# Patient Record
Sex: FEMALE | Race: WHITE | NOT HISPANIC OR LATINO | Employment: OTHER | URBAN - METROPOLITAN AREA
[De-identification: names, ages, dates, MRNs, and addresses within clinical notes are randomized per-mention and may not be internally consistent; named-entity substitution may affect disease eponyms.]

---

## 2017-01-13 ENCOUNTER — HOSPITAL ENCOUNTER (OUTPATIENT)
Dept: MAMMOGRAPHY | Facility: CLINIC | Age: 67
Discharge: HOME | End: 2017-01-13
Attending: FAMILY MEDICINE | Admitting: FAMILY MEDICINE
Payer: COMMERCIAL

## 2017-01-13 DIAGNOSIS — Z12.31 VISIT FOR SCREENING MAMMOGRAM: ICD-10-CM

## 2017-01-13 PROCEDURE — G0202 SCR MAMMO BI INCL CAD: HCPCS

## 2017-08-21 ENCOUNTER — MYC MEDICAL ADVICE (OUTPATIENT)
Dept: FAMILY MEDICINE | Facility: OTHER | Age: 67
End: 2017-08-21

## 2017-08-26 ENCOUNTER — MYC MEDICAL ADVICE (OUTPATIENT)
Dept: FAMILY MEDICINE | Facility: OTHER | Age: 67
End: 2017-08-26

## 2017-11-13 DIAGNOSIS — F41.1 GENERALIZED ANXIETY DISORDER: ICD-10-CM

## 2017-11-13 DIAGNOSIS — E78.5 HYPERLIPIDEMIA, UNSPECIFIED: ICD-10-CM

## 2017-11-13 DIAGNOSIS — R94.6 NONSPECIFIC ABNORMAL RESULTS OF THYROID FUNCTION STUDY: ICD-10-CM

## 2017-11-13 DIAGNOSIS — M81.0 AGE-RELATED OSTEOPOROSIS WITHOUT CURRENT PATHOLOGICAL FRACTURE: Primary | ICD-10-CM

## 2017-11-13 NOTE — TELEPHONE ENCOUNTER
LORazepam (ATIVAN) 1 MG tablet      Last Written Prescription Date:  12/2/16  Last Fill Quantity: 90,   # refills: 1  Future Office visit:       Routing refill request to provider for review/approval because:  Drug not on the [facility] or [facility] refill protocol or controlled substance

## 2017-11-14 PROBLEM — M81.0 AGE-RELATED OSTEOPOROSIS WITHOUT CURRENT PATHOLOGICAL FRACTURE: Status: ACTIVE | Noted: 2017-11-14

## 2017-11-14 RX ORDER — SIMVASTATIN 40 MG
TABLET ORAL
Qty: 30 TABLET | Refills: 0 | Status: SHIPPED | OUTPATIENT
Start: 2017-11-14

## 2017-11-14 RX ORDER — ALENDRONATE SODIUM 70 MG/1
TABLET ORAL
Qty: 12 TABLET | Refills: 0 | Status: ON HOLD | OUTPATIENT
Start: 2017-11-14 | End: 2018-04-11

## 2017-11-14 RX ORDER — LEVOTHYROXINE SODIUM 75 UG/1
TABLET ORAL
Qty: 30 TABLET | Refills: 0 | Status: SHIPPED | OUTPATIENT
Start: 2017-11-14

## 2017-11-14 NOTE — TELEPHONE ENCOUNTER
Fosamax:  Routing refill request to provider for review/approval because:  Labs not current:  DEXA     Synthroid:  Prescription approved per [facility] Refill Protocol.    Angela Montaño, RN, BSN

## 2017-11-14 NOTE — TELEPHONE ENCOUNTER
Requested Prescriptions   Pending Prescriptions Disp Refills     simvastatin (ZOCOR) 40 MG tablet 365 tablet 0     Sig: TAKE ONE TABLET BY MOUTH AT BEDTIME    [facility]ins Protocol Passed    2017  3:52 PM       Passed - LDL on file in past 12 months    Recent Labs   Lab Test  16   0729   LDL  83            Passed - No abnormal creatine kinase in past 12 months    No lab results found.         Passed - Recent or future visit with authorizing provider    Patient had office visit in the last year or has a visit in the next 30 days with authorizing provider.  See chart review.              Passed - Patient is age 18 or older       Passed - No active pregnancy on record       Passed - No positive pregnancy test in past 12 months        LORazepam (ATIVAN) 1 MG tablet 90 tablet 1     Si TABLET PO QHS prn    There is no refill protocol information for this order        simvastatin (ZOCOR) 40 MG tablet  Medication is being filled for 1 time refill only due to:  Patient needs to be seen because due for physical.    LORazepam (ATIVAN) 1 MG tablet  Routing refill request to provider for review/approval because:  Drug not on the [facility] refill protocol   Cate Jin, RN, BSN

## 2017-11-14 NOTE — TELEPHONE ENCOUNTER
Medication(s) reviewed and approved. Rx. was faxed to the designated pharmacy.      Please notify that this has been done.      Please close the encounter when finished with it.     Samantha Perez PA-C

## 2017-11-15 RX ORDER — LORAZEPAM 1 MG/1
TABLET ORAL
Qty: 30 TABLET | Refills: 0 | Status: SHIPPED | OUTPATIENT
Start: 2017-11-15

## 2017-11-15 NOTE — TELEPHONE ENCOUNTER
appropriate with only 1 fill this last year.  Coming up on annual visit in a few weeks.  Please call to schedule. TC patient.  Kanwal Pritchett MD

## 2017-11-15 NOTE — TELEPHONE ENCOUNTER
Patient returned call and received message below and patient thought she told who ever called her yesterday that we are no longer her clinic and to cancel medication request.    Sarah Perez  Reception/ Scheduling

## 2018-03-26 RX ORDER — KETOROLAC TROMETHAMINE 5 MG/ML
SOLUTION OPHTHALMIC
COMMUNITY
End: 2023-09-11

## 2018-03-26 RX ORDER — PREDNISOLONE ACETATE 10 MG/ML
SUSPENSION/ DROPS OPHTHALMIC
COMMUNITY
End: 2023-09-11

## 2018-03-26 RX ORDER — OFLOXACIN 3 MG/ML
SOLUTION/ DROPS OPHTHALMIC
COMMUNITY
End: 2023-09-11

## 2018-03-28 ENCOUNTER — HOSPITAL ENCOUNTER (OUTPATIENT)
Facility: CLINIC | Age: 68
Discharge: HOME | End: 2018-03-28
Attending: OPHTHALMOLOGY | Admitting: OPHTHALMOLOGY
Payer: MEDICARE

## 2018-03-28 ENCOUNTER — SURGERY (OUTPATIENT)
Age: 68
End: 2018-03-28

## 2018-03-28 ENCOUNTER — ANESTHESIA EVENT (OUTPATIENT)
Dept: SURGERY | Facility: CLINIC | Age: 68
End: 2018-03-28
Payer: MEDICARE

## 2018-03-28 ENCOUNTER — ANESTHESIA (OUTPATIENT)
Dept: SURGERY | Facility: CLINIC | Age: 68
End: 2018-03-28
Payer: MEDICARE

## 2018-03-28 VITALS
SYSTOLIC BLOOD PRESSURE: 117 MMHG | HEIGHT: 61 IN | OXYGEN SATURATION: 96 % | BODY MASS INDEX: 27.26 KG/M2 | RESPIRATION RATE: 14 BRPM | DIASTOLIC BLOOD PRESSURE: 66 MMHG | WEIGHT: 144.4 LBS | TEMPERATURE: 98.6 F

## 2018-03-28 DIAGNOSIS — H25.11 NUCLEAR SCLEROTIC CATARACT, RIGHT: Primary | ICD-10-CM

## 2018-03-28 PROCEDURE — 27210794 ZZH OR GENERAL SUPPLY STERILE: Performed by: OPHTHALMOLOGY

## 2018-03-28 PROCEDURE — V2632 POST CHMBR INTRAOCULAR LENS: HCPCS | Performed by: OPHTHALMOLOGY

## 2018-03-28 PROCEDURE — 25000125 ZZHC RX 250: Performed by: ANESTHESIOLOGY

## 2018-03-28 PROCEDURE — 25000128 H RX IP 250 OP 636: Performed by: ANESTHESIOLOGY

## 2018-03-28 PROCEDURE — 25000125 ZZHC RX 250: Performed by: OPHTHALMOLOGY

## 2018-03-28 PROCEDURE — 40000170 ZZH STATISTIC PRE-PROCEDURE ASSESSMENT II: Performed by: OPHTHALMOLOGY

## 2018-03-28 PROCEDURE — 25000128 H RX IP 250 OP 636: Performed by: NURSE ANESTHETIST, CERTIFIED REGISTERED

## 2018-03-28 PROCEDURE — 37000008 ZZH ANESTHESIA TECHNICAL FEE, 1ST 30 MIN: Performed by: OPHTHALMOLOGY

## 2018-03-28 PROCEDURE — 36000101 ZZH EYE SURGERY LEVEL 3 1ST 30 MIN: Performed by: OPHTHALMOLOGY

## 2018-03-28 PROCEDURE — 71000028 ZZH EYE RECOVERY PHASE 2 EACH 15 MINS: Performed by: OPHTHALMOLOGY

## 2018-03-28 PROCEDURE — 25000128 H RX IP 250 OP 636: Performed by: OPHTHALMOLOGY

## 2018-03-28 DEVICE — EYE IMP IOL AMO PCL TECNIS ZCB00 22.0: Type: IMPLANTABLE DEVICE | Site: EYE | Status: FUNCTIONAL

## 2018-03-28 RX ORDER — PHENYLEPHRINE HYDROCHLORIDE 25 MG/ML
1 SOLUTION/ DROPS OPHTHALMIC
Status: COMPLETED | OUTPATIENT
Start: 2018-03-28 | End: 2018-03-28

## 2018-03-28 RX ORDER — TROPICAMIDE 10 MG/ML
1 SOLUTION/ DROPS OPHTHALMIC
Status: COMPLETED | OUTPATIENT
Start: 2018-03-28 | End: 2018-03-28

## 2018-03-28 RX ORDER — MOXIFLOXACIN 5 MG/ML
1 SOLUTION/ DROPS OPHTHALMIC
Status: COMPLETED | OUTPATIENT
Start: 2018-03-28 | End: 2018-03-28

## 2018-03-28 RX ORDER — PREDNISOLONE ACETATE 1 %
SUSPENSION, DROPS(FINAL DOSAGE FORM)(ML) OPHTHALMIC (EYE) PRN
Status: DISCONTINUED | OUTPATIENT
Start: 2018-03-28 | End: 2018-03-28 | Stop reason: HOSPADM

## 2018-03-28 RX ORDER — SODIUM CHLORIDE, SODIUM LACTATE, POTASSIUM CHLORIDE, CALCIUM CHLORIDE 600; 310; 30; 20 MG/100ML; MG/100ML; MG/100ML; MG/100ML
500 INJECTION, SOLUTION INTRAVENOUS CONTINUOUS
Status: DISCONTINUED | OUTPATIENT
Start: 2018-03-28 | End: 2018-03-28 | Stop reason: HOSPADM

## 2018-03-28 RX ORDER — CYCLOPENTOLATE HYDROCHLORIDE 10 MG/ML
1 SOLUTION/ DROPS OPHTHALMIC
Status: COMPLETED | OUTPATIENT
Start: 2018-03-28 | End: 2018-03-28

## 2018-03-28 RX ORDER — ONDANSETRON 2 MG/ML
INJECTION INTRAMUSCULAR; INTRAVENOUS PRN
Status: DISCONTINUED | OUTPATIENT
Start: 2018-03-28 | End: 2018-03-28

## 2018-03-28 RX ORDER — PROPARACAINE HYDROCHLORIDE 5 MG/ML
1 SOLUTION/ DROPS OPHTHALMIC ONCE
Status: COMPLETED | OUTPATIENT
Start: 2018-03-28 | End: 2018-03-28

## 2018-03-28 RX ORDER — BALANCED SALT SOLUTION 6.4; .75; .48; .3; 3.9; 1.7 MG/ML; MG/ML; MG/ML; MG/ML; MG/ML; MG/ML
SOLUTION OPHTHALMIC PRN
Status: DISCONTINUED | OUTPATIENT
Start: 2018-03-28 | End: 2018-03-28 | Stop reason: HOSPADM

## 2018-03-28 RX ORDER — PROPARACAINE HYDROCHLORIDE 5 MG/ML
1 SOLUTION/ DROPS OPHTHALMIC ONCE
Status: DISCONTINUED | OUTPATIENT
Start: 2018-03-28 | End: 2018-03-28 | Stop reason: HOSPADM

## 2018-03-28 RX ORDER — LIDOCAINE HYDROCHLORIDE 10 MG/ML
INJECTION, SOLUTION EPIDURAL; INFILTRATION; INTRACAUDAL; PERINEURAL PRN
Status: DISCONTINUED | OUTPATIENT
Start: 2018-03-28 | End: 2018-03-28 | Stop reason: HOSPADM

## 2018-03-28 RX ADMIN — ONDANSETRON 4 MG: 2 INJECTION INTRAMUSCULAR; INTRAVENOUS at 14:11

## 2018-03-28 RX ADMIN — MIDAZOLAM 2 MG: 1 INJECTION INTRAMUSCULAR; INTRAVENOUS at 14:10

## 2018-03-28 RX ADMIN — Medication 1 DROP: at 14:20

## 2018-03-28 RX ADMIN — SODIUM CHONDROITIN SULFATE / SODIUM HYALURONATE 1 ML: 0.55-0.5 INJECTION INTRAOCULAR at 14:20

## 2018-03-28 RX ADMIN — MOXIFLOXACIN 1 DROP: 5 SOLUTION/ DROPS OPHTHALMIC at 12:38

## 2018-03-28 RX ADMIN — CYCLOPENTOLATE HYDROCHLORIDE 1 DROP: 10 SOLUTION/ DROPS OPHTHALMIC at 12:31

## 2018-03-28 RX ADMIN — TROPICAMIDE 1 DROP: 10 SOLUTION/ DROPS OPHTHALMIC at 12:31

## 2018-03-28 RX ADMIN — PHENYLEPHRINE HYDROCHLORIDE 1 DROP: 2.5 SOLUTION/ DROPS OPHTHALMIC at 12:31

## 2018-03-28 RX ADMIN — MOXIFLOXACIN 1 DROP: 5 SOLUTION/ DROPS OPHTHALMIC at 12:26

## 2018-03-28 RX ADMIN — LIDOCAINE HYDROCHLORIDE 1 ML: 10 INJECTION, SOLUTION EPIDURAL; INFILTRATION; INTRACAUDAL; PERINEURAL at 14:20

## 2018-03-28 RX ADMIN — MOXIFLOXACIN 1 DROP: 5 SOLUTION/ DROPS OPHTHALMIC at 12:31

## 2018-03-28 RX ADMIN — TROPICAMIDE 1 DROP: 10 SOLUTION/ DROPS OPHTHALMIC at 12:38

## 2018-03-28 RX ADMIN — CYCLOPENTOLATE HYDROCHLORIDE 1 DROP: 10 SOLUTION/ DROPS OPHTHALMIC at 12:38

## 2018-03-28 RX ADMIN — EPINEPHRINE 500 ML: 1 INJECTION, SOLUTION, CONCENTRATE INTRAVENOUS at 14:19

## 2018-03-28 RX ADMIN — PHENYLEPHRINE HYDROCHLORIDE 1 DROP: 2.5 SOLUTION/ DROPS OPHTHALMIC at 12:38

## 2018-03-28 RX ADMIN — SODIUM CHLORIDE, POTASSIUM CHLORIDE, SODIUM LACTATE AND CALCIUM CHLORIDE 500 ML: 600; 310; 30; 20 INJECTION, SOLUTION INTRAVENOUS at 12:39

## 2018-03-28 RX ADMIN — BALANCED SALT SOLUTION 15 ML: 6.4; .75; .48; .3; 3.9; 1.7 SOLUTION OPHTHALMIC at 14:19

## 2018-03-28 RX ADMIN — TROPICAMIDE 1 DROP: 10 SOLUTION/ DROPS OPHTHALMIC at 12:25

## 2018-03-28 RX ADMIN — PHENYLEPHRINE HYDROCHLORIDE 1 DROP: 2.5 SOLUTION/ DROPS OPHTHALMIC at 12:26

## 2018-03-28 RX ADMIN — PROPARACAINE HYDROCHLORIDE 1 DROP: 5 SOLUTION/ DROPS OPHTHALMIC at 12:26

## 2018-03-28 RX ADMIN — LIDOCAINE HYDROCHLORIDE 1 ML: 10 INJECTION, SOLUTION EPIDURAL; INFILTRATION; INTRACAUDAL; PERINEURAL at 12:38

## 2018-03-28 RX ADMIN — CYCLOPENTOLATE HYDROCHLORIDE 1 DROP: 10 SOLUTION/ DROPS OPHTHALMIC at 12:26

## 2018-03-28 ASSESSMENT — LIFESTYLE VARIABLES: TOBACCO_USE: 0

## 2018-03-28 NOTE — DISCHARGE INSTRUCTIONS
[facility] Anesthesia [facility] Care [facility] Discharge  Instructions  Anesthesia (Eye Care Center)   Adult Discharge Instructions    For 24 hours after surgery    1. Get plenty of rest.  Make arrangements to have a responsible adult stay with you for at least 6 hours after you leave the hospital.  2. Do not drive or use heavy equipment for 24 hours.    3. Do not drink alcohol for 24 hours.  4. Do not sign legal documents or make important decisions for 24 hours.  5. Avoid strenuous or risky activities. You may feel lightheaded.  If so, sit for a few minutes before standing.  Have someone help you get up.   6. Conscious sedation patients may resume a regular diet..  7. Any questions of medical nature, call your physician.    [facility]  Cataract Surgery Discharge Instructions  [facility] MD CALIXTO Cuenca MD J. Hasan, MD C. Nichols, MD J. O'Neill, MD S. Schaefer, MD J. Stephens, MD        Start using drops when you arrive at home today    Vigamox or Ofloxacin (Tan top) - one drop in surgical eye 3 times  per day until gone.     Prednisolone acetate 1.0% (pink or white top) - one drop in surgical eye 3 times per day until gone.    Ketorolac or Diclofenac (Grey top) - one drop in surgical eye 3 times per day until gone.   (Dr. Huntley's patients should use all drops 4 times daily)       OR     You may have been prescribed SmartDrops or OneDrop, which is a compound formula drop that combines all three medications in a single drop. This drop should be instilled to the surgical eye 3 times daily until gone.        Place shield over surgical eye at bedtime for 3 nights.      The eye will feel itchy, scratchy, and vision will be blurred, you may take Tylenol for the scratchy feeling if this is bothersome.      No eye rubbing or swimming for I week.      You may resume all prescription medications as directed by your primary  doctor.      Call if increasing pain, progressively worsening vision or worsening redness of surgical eye.      On-call doctor can be reached at [phone].

## 2018-03-28 NOTE — IP AVS SNAPSHOT
[facility]    [address]    [place] [address]    Phone:  [phone]    Fax:  [phone]                                       After Visit Summary   3/28/2018    Debra Fuentes    MRN: [id number]           After Visit Summary Signature Page     I have received my discharge instructions, and my questions have been answered. I have discussed any challenges I see with this plan with the nurse or doctor.    ..........................................................................................................................................  Patient/Patient Representative Signature      ..........................................................................................................................................  Patient Representative Print Name and Relationship to Patient    ..................................................               ................................................  Date                                            Time    ..........................................................................................................................................  Reviewed by Signature/Title    ...................................................              ..............................................  Date                                                            Time

## 2018-03-28 NOTE — ANESTHESIA POSTPROCEDURE EVALUATION
Patient: Debra Fuentes    Procedure(s):  RIGHT EYE PHACOEMULSIFICATION CLEAR CORNEA WITH STANDARD INTRAOCULAR LENS IMPLANT  - Wound Class: I-Clean    Diagnosis:CATARACT  Diagnosis Additional Information: No value filed.    Anesthesia Type:  MAC    Note:  Anesthesia Post Evaluation    Patient location during evaluation: PACU  Patient participation: Able to fully participate in evaluation  Level of consciousness: awake  Pain management: adequate  Cardiovascular status: acceptable  Respiratory status: acceptable  Hydration status: acceptable  PONV: none     Anesthetic complications: None          Last vitals:  Vitals:    03/28/18 1230 03/28/18 1431 03/28/18 1442   BP: 121/68 105/68 117/66   Resp: 16 14 14   Temp: 37  C (98.6  F)     SpO2: 96% 96% 96%         Electronically Signed By: KADY HESTER  March 28, 2018  4:10 PM

## 2018-03-28 NOTE — OP NOTE
PREOPERATIVE DIAGNOSIS: Visually significant cataract, Right eye   POSTOPERATIVE DIAGNOSIS: Same   PROCEDURES:   1. Cataract extraction with intraocular lens implant Right eye.  SURGEON: Damian Parnell M.D.  INDICATIONS: The patient Debra Fuentes presented to the eye clinic with decreased vision secondary to cataract in the Right eye. The risks, including, but not limited to infection, loss of vision, loss of eye, need for more surgery, and bleeding, along with the benefits, alternatives, expectations, and the procedure itself were discussed at length with the patient who wished to proceed with surgery. All questions were answered to the patient's satisfaction.  DESCRIPTION OF PROCEDURE:   Prior to the procedure, appropriate cardiac and respiratory monitors were applied to the patient.  In the pre-operative holding area, a drop of topical tetracaine followed by povidone iodine followed by lidocaine gel.  The patient was brought to the operating room where a surgical pause was carried out to identify with all members of the surgical team the correct surgical site.  With adequate anesthesia, the Right eye was prepped and draped in the usual sterile fashion. A lid speculum was placed, and the operating microscope was rotated into position. A paracentesis was created.  Through this limbal paracentesis, the anterior chamber was filled with preservative-free lidocaine followed by viscoelastic.   A temporal clear corneal incision was created at the limbus using a 2.6 mm blade. A capsulorrhexis was initiated using a cystotome and was completed in continuous and circular fashion using the capsulorrhexis forceps. The lens nucleus was hydrodissected using balanced salt solution.  The lens nucleus was rotated and removed using phacoemulsification in a stop and chop technique.  Residual cortical material was removed using irrigation-aspiration.  The capsular bag was reinflated to its maximal extent with cohesive  viscoelastic.  A 22.0 diopter ZCBOO was inserted into the capsular bag and noted to be well centered.  The lens power selected was reviewed using the intraocular lens power measurements that were obtained preoperatively to confirm that the correct lens was selected for the desired post-operative refractive state. The residual viscoelastic was aspirated. The anterior chamber was inflated with balanced salt solution and the wounds were hydrated and found to be self-sealing.  The eye was palpated and found to be of normal physiologic pressure. The lid speculum was removed. One drop of postoperative anti-inflammatory and antibiotic medication was instilled in the eye and a clear shield placed over the eye. The patient tolerated the procedure well and there were no intraoperative complications.      PLAN: The patient will be discharged to home and will follow up tomorrow in the eye clinic.  EBL:  None  Complications:  None    Implant Name Type Inv. Item Serial No.  Lot No. LRB No. Used   EYE IMP IOL NOEL PCL TECNIS ZCB00 22.0 Lens/Eye Implant EYE IMP IOL NOEL PCL TECNIS ZCB00 22.0 [id number] [employer]   Right 1

## 2018-03-28 NOTE — ANESTHESIA CARE TRANSFER NOTE
Patient: Dbera Fuentes    Procedure(s):  RIGHT EYE PHACOEMULSIFICATION CLEAR CORNEA WITH STANDARD INTRAOCULAR LENS IMPLANT  - Wound Class: I-Clean    Diagnosis: CATARACT  Diagnosis Additional Information: No value filed.    Anesthesia Type:   MAC     Note:  Airway :Room Air  Patient transferred to:Phase II  Comments: Pt awake and able to verbalize needs. Spontaneous respiration without difficulty.  All monitors on and audible. Report given to PACU RN, Vital Signs Stable. Pt denies pain.Handoff Report: Identifed the Patient, Identified the Reponsible Provider, Reviewed the pertinent medical history, Discussed the surgical course, Reviewed Intra-OP anesthesia mangement and issues during anesthesia, Set expectations for post-procedure period and Allowed opportunity for questions and acknowledgement of understanding      Vitals: (Last set prior to Anesthesia Care Transfer)    CRNA VITALS  3/28/2018 1356 - 3/28/2018 1431      3/28/2018             NIBP: 121/67    Pulse: 74    NIBP Mean: 85    Ht Rate: 73    SpO2: 99 %    Resp Rate (set): 10                Electronically Signed By: CHERI Healy CRNA  March 28, 2018  2:31 PM

## 2018-03-28 NOTE — ANESTHESIA PREPROCEDURE EVALUATION
Anesthesia Evaluation     . Pt has had prior anesthetic.     No history of anesthetic complications          ROS/MED HX    ENT/Pulmonary:      (-) tobacco use and sleep apnea   Neurologic:       Cardiovascular:     (+) Dyslipidemia, ----. : . . . :. .       METS/Exercise Tolerance:     Hematologic:         Musculoskeletal:         GI/Hepatic:        (-) GERD   Renal/Genitourinary:         Endo:     (+) thyroid problem hypothyroidism, .      Psychiatric:     (+) psychiatric history anxiety      Infectious Disease:         Malignancy:         Other:                     Physical Exam  Normal systems: cardiovascular and pulmonary    Airway   Mallampati: II  TM distance: >3 FB  Neck ROM: full    Dental   Comment: native    Cardiovascular       Pulmonary                     Anesthesia Plan      History & Physical Review  History and physical reviewed and following examination; no interval change.    ASA Status:  2 .    NPO Status:  > 8 hours    Plan for MAC          Postoperative Care      Consents  Anesthetic plan, risks, benefits and alternatives discussed with:  Patient..                          .

## 2018-03-28 NOTE — IP AVS SNAPSHOT
MRN:[id number]                      After Visit Summary   3/28/2018    Debra Fuentes    MRN: [id number]           Thank you!     Thank you for choosing [facility] for your care. Our goal is always to provide you with excellent care. Hearing back from our patients is one way we can continue to improve our services. Please take a few minutes to complete the written survey that you may receive in the mail after you visit with us. Thank you!        Patient Information     Date Of Birth          1950        About your hospital stay     You were admitted on:  March 28, 2018 You last received care in the:  [facility] [place]    You were discharged on:  March 28, 2018       Who to Call     For medical emergencies, please call 911.  For non-urgent questions about your medical care, please call your primary care provider or clinic, [phone]  For questions related to your surgery, please call your surgery clinic        Attending Provider     Provider Specialty    Damian Parnell MD Ophthalmology       Primary Care Provider Office Phone # Fax #    [facility] [phone]      Your next 10 appointments already scheduled     Apr 11, 2018   Procedure with Damian Parnell MD   [facility] (--)    [address]  [facility] [address] [phone]              Further instructions from your care team       [facility] Anesthesia [facility] Care [facility] Discharge  Instructions  Anesthesia ([facility] [place])   Adult Discharge Instructions    For 24 hours after surgery    1. Get plenty of rest.  Make arrangements to have a responsible adult stay with you for at least 6 hours after you leave the hospital.  2. Do not drive or use heavy equipment for 24 hours.    3. Do not drink alcohol for 24 hours.  4. Do not sign legal documents or make important decisions for 24 hours.  5. Avoid strenuous or risky activities. You may feel  lightheaded.  If so, sit for a few minutes before standing.  Have someone help you get up.   6. Conscious sedation patients may resume a regular diet..  7. Any questions of medical nature, call your physician.    [facility]  Cataract Surgery Discharge Instructions  [facility] MD CALIXTO Cuenca MD J. Hasan, MD C. Nichols, MD J. O'Neill, MD S. Schaefer, MD J. Stephens, MD        Start using drops when you arrive at home today    Vigamox or Ofloxacin (Tan top) - one drop in surgical eye 3 times  per day until gone.     Prednisolone acetate 1.0% (pink or white top) - one drop in surgical eye 3 times per day until gone.    Ketorolac or Diclofenac (Grey top) - one drop in surgical eye 3 times per day until gone.   (Dr. Huntley's patients should use all drops 4 times daily)       OR     You may have been prescribed SmartDrops or OneDrop, which is a compound formula drop that combines all three medications in a single drop. This drop should be instilled to the surgical eye 3 times daily until gone.        Place shield over surgical eye at bedtime for 3 nights.      The eye will feel itchy, scratchy, and vision will be blurred, you may take Tylenol for the scratchy feeling if this is bothersome.      No eye rubbing or swimming for I week.      You may resume all prescription medications as directed by your primary doctor.      Call if increasing pain, progressively worsening vision or worsening redness of surgical eye.      On-call doctor can be reached at [phone].    Pending Results     No orders found from 3/26/2018 to 3/29/2018.            Admission Information     Date & Time Provider Department Dept. Phone    3/28/2018 Damian Parnell MD [facility] [place] [phone]      Your Vitals Were     Blood Pressure Temperature Respirations Height Weight Last Period    105/68 98.6  F (37  C) (Temporal) 14  "1.543 m (5' 0.75\") 65.5 kg (144 lb 6.4 oz) 05/15/2005    Pulse Oximetry BMI (Body Mass Index)                96% 27.51 kg/m2          [employer] Information     [employer] gives you secure access to your electronic health record. If you see a primary care provider, you can also send messages to your care team and make appointments. If you have questions, please call your primary care clinic.  If you do not have a primary care provider, please call [phone] and they will assist you.        Care EveryWhere ID     This is your Care EveryWhere ID. This could be used by other organizations to access your [facility] medical records  [id number]        Equal Access to Services     GITA CARDONA : Alejandro Fernandez, holley davis, melba light, satinder gastelum. So [facility] [phone].    ATENCIÓN: Si habla español, tiene a jett disposición servicios gratuitos de asistencia lingüística. Llame al [phone].    We comply with applicable federal civil rights laws and [place] laws. We do not discriminate on the basis of race, color, national origin, age, disability, sex, sexual orientation, or gender identity.               Review of your medicines      CONTINUE these medicines which have NOT CHANGED        Dose / Directions    alendronate 70 MG tablet   Commonly known as:  FOSAMAX   Used for:  Age-related osteoporosis without current pathological fracture        TAKE 1 TABLET EVERY 7 DAYS WITH 8 OZ. OF WATER. 60 MIN BEFORE BREAKFAST. REMAIN UPRIGHT FOR 30 MIN.   Quantity:  12 tablet   Refills:  0       calcium carbonate-vitamin D 500-400 MG-UNIT Tabs per tablet   Used for:  Osteoporosis        Dose:  1 tablet   Take 1 tablet by mouth 2 times daily   Quantity:  730 tablet   Refills:  0       fluticasone 50 MCG/ACT spray   Commonly known as:  FLONASE   Used for:  Chronic rhinitis        Dose:  2 spray   Spray 2 sprays into both nostrils daily   Quantity:  192 g   Refills:  0       " ketorolac 0.5 % ophthalmic solution   Commonly known as:  ACULAR        Refills:  0       levothyroxine 75 MCG tablet   Commonly known as:  SYNTHROID/LEVOTHROID   Used for:  Nonspecific abnormal results of thyroid function study        TAKE ONE TABLET BY MOUTH DAILY   Quantity:  30 tablet   Refills:  0       LORazepam 1 MG tablet   Commonly known as:  ATIVAN   Used for:  Generalized anxiety disorder        1 TABLET PO QHS prn   Quantity:  30 tablet   Refills:  0       multivitamin, therapeutic with minerals Tabs tablet   Used for:  Osteoporosis        1 daily   Quantity:  365 each   Refills:  0       ofloxacin 0.3 % ophthalmic solution   Commonly known as:  OCUFLOX        Refills:  0       OMEGA-3 FISH OIL PO        Dose:  2 g   Take 2 g by mouth daily   Refills:  0       prednisoLONE acetate 1 % ophthalmic susp   Commonly known as:  PRED FORTE        Refills:  0       simvastatin 40 MG tablet   Commonly known as:  ZOCOR   Used for:  Hyperlipidemia, unspecified        TAKE ONE TABLET BY MOUTH AT BEDTIME   Quantity:  30 tablet   Refills:  0                Protect others around you: Learn how to safely use, store and throw away your medicines at www.disposemymeds.org.             Medication List: This is a list of all your medications and when to take them. Check marks below indicate your daily home schedule. Keep this list as a reference.      Medications           Morning Afternoon Evening Bedtime As Needed    alendronate 70 MG tablet   Commonly known as:  FOSAMAX   TAKE 1 TABLET EVERY 7 DAYS WITH 8 OZ. OF WATER. 60 MIN BEFORE BREAKFAST. REMAIN UPRIGHT FOR 30 MIN.                                calcium carbonate-vitamin D 500-400 MG-UNIT Tabs per tablet   Take 1 tablet by mouth 2 times daily                                fluticasone 50 MCG/ACT spray   Commonly known as:  FLONASE   Spray 2 sprays into both nostrils daily                                ketorolac 0.5 % ophthalmic solution   Commonly known as:  ACULAR                                 levothyroxine 75 MCG tablet   Commonly known as:  SYNTHROID/LEVOTHROID   TAKE ONE TABLET BY MOUTH DAILY                                LORazepam 1 MG tablet   Commonly known as:  ATIVAN   1 TABLET PO QHS prn                                multivitamin, therapeutic with minerals Tabs tablet   1 daily                                ofloxacin 0.3 % ophthalmic solution   Commonly known as:  OCUFLOX   Last time this was given:  1 drop on 3/28/2018  2:20 PM                                OMEGA-3 FISH OIL PO   Take 2 g by mouth daily                                prednisoLONE acetate 1 % ophthalmic susp   Commonly known as:  PRED FORTE   Last time this was given:  1 drop on 3/28/2018  2:20 PM                                simvastatin 40 MG tablet   Commonly known as:  ZOCOR   TAKE ONE TABLET BY MOUTH AT BEDTIME

## 2018-04-11 ENCOUNTER — ANESTHESIA (OUTPATIENT)
Dept: SURGERY | Facility: CLINIC | Age: 68
End: 2018-04-11
Payer: MEDICARE

## 2018-04-11 ENCOUNTER — ANESTHESIA EVENT (OUTPATIENT)
Dept: SURGERY | Facility: CLINIC | Age: 68
End: 2018-04-11
Payer: MEDICARE

## 2018-04-11 ENCOUNTER — HOSPITAL ENCOUNTER (OUTPATIENT)
Facility: CLINIC | Age: 68
Discharge: HOME | End: 2018-04-11
Attending: OPHTHALMOLOGY | Admitting: OPHTHALMOLOGY
Payer: MEDICARE

## 2018-04-11 VITALS
OXYGEN SATURATION: 97 % | HEIGHT: 60 IN | SYSTOLIC BLOOD PRESSURE: 104 MMHG | HEART RATE: 70 BPM | WEIGHT: 144.4 LBS | RESPIRATION RATE: 16 BRPM | TEMPERATURE: 97.9 F | BODY MASS INDEX: 28.35 KG/M2 | DIASTOLIC BLOOD PRESSURE: 71 MMHG

## 2018-04-11 DIAGNOSIS — H25.12 NUCLEAR SCLEROTIC CATARACT, LEFT: Primary | ICD-10-CM

## 2018-04-11 PROCEDURE — 37000008 ZZH ANESTHESIA TECHNICAL FEE, 1ST 30 MIN: Performed by: OPHTHALMOLOGY

## 2018-04-11 PROCEDURE — 25000128 H RX IP 250 OP 636: Performed by: NURSE ANESTHETIST, CERTIFIED REGISTERED

## 2018-04-11 PROCEDURE — 40000170 ZZH STATISTIC PRE-PROCEDURE ASSESSMENT II: Performed by: OPHTHALMOLOGY

## 2018-04-11 PROCEDURE — 25000128 H RX IP 250 OP 636: Performed by: OPHTHALMOLOGY

## 2018-04-11 PROCEDURE — 25000125 ZZHC RX 250: Performed by: OPHTHALMOLOGY

## 2018-04-11 PROCEDURE — 71000028 ZZH EYE RECOVERY PHASE 2 EACH 15 MINS: Performed by: OPHTHALMOLOGY

## 2018-04-11 PROCEDURE — V2632 POST CHMBR INTRAOCULAR LENS: HCPCS | Performed by: OPHTHALMOLOGY

## 2018-04-11 PROCEDURE — 27210794 ZZH OR GENERAL SUPPLY STERILE: Performed by: OPHTHALMOLOGY

## 2018-04-11 PROCEDURE — 25000125 ZZHC RX 250: Performed by: NURSE ANESTHETIST, CERTIFIED REGISTERED

## 2018-04-11 PROCEDURE — 25000128 H RX IP 250 OP 636: Performed by: ANESTHESIOLOGY

## 2018-04-11 PROCEDURE — 36000101 ZZH EYE SURGERY LEVEL 3 1ST 30 MIN: Performed by: OPHTHALMOLOGY

## 2018-04-11 PROCEDURE — 25000125 ZZHC RX 250: Performed by: ANESTHESIOLOGY

## 2018-04-11 DEVICE — EYE IMP IOL AMO PCL TECNIS ZCB00 20.5: Type: IMPLANTABLE DEVICE | Site: EYE | Status: FUNCTIONAL

## 2018-04-11 RX ORDER — MOXIFLOXACIN 5 MG/ML
1 SOLUTION/ DROPS OPHTHALMIC
Status: COMPLETED | OUTPATIENT
Start: 2018-04-11 | End: 2018-04-11

## 2018-04-11 RX ORDER — PREDNISOLONE ACETATE 1 %
SUSPENSION, DROPS(FINAL DOSAGE FORM)(ML) OPHTHALMIC (EYE) PRN
Status: DISCONTINUED | OUTPATIENT
Start: 2018-04-11 | End: 2018-04-11 | Stop reason: HOSPADM

## 2018-04-11 RX ORDER — ONDANSETRON 2 MG/ML
INJECTION INTRAMUSCULAR; INTRAVENOUS PRN
Status: DISCONTINUED | OUTPATIENT
Start: 2018-04-11 | End: 2018-04-11

## 2018-04-11 RX ORDER — PROPARACAINE HYDROCHLORIDE 5 MG/ML
1 SOLUTION/ DROPS OPHTHALMIC ONCE
Status: DISCONTINUED | OUTPATIENT
Start: 2018-04-11 | End: 2018-04-11 | Stop reason: HOSPADM

## 2018-04-11 RX ORDER — LIDOCAINE HYDROCHLORIDE 10 MG/ML
INJECTION, SOLUTION EPIDURAL; INFILTRATION; INTRACAUDAL; PERINEURAL PRN
Status: DISCONTINUED | OUTPATIENT
Start: 2018-04-11 | End: 2018-04-11 | Stop reason: HOSPADM

## 2018-04-11 RX ORDER — SODIUM CHLORIDE, SODIUM LACTATE, POTASSIUM CHLORIDE, CALCIUM CHLORIDE 600; 310; 30; 20 MG/100ML; MG/100ML; MG/100ML; MG/100ML
500 INJECTION, SOLUTION INTRAVENOUS CONTINUOUS
Status: DISCONTINUED | OUTPATIENT
Start: 2018-04-11 | End: 2018-04-11 | Stop reason: HOSPADM

## 2018-04-11 RX ORDER — PROPARACAINE HYDROCHLORIDE 5 MG/ML
1 SOLUTION/ DROPS OPHTHALMIC ONCE
Status: COMPLETED | OUTPATIENT
Start: 2018-04-11 | End: 2018-04-11

## 2018-04-11 RX ORDER — CYCLOPENTOLATE HYDROCHLORIDE 10 MG/ML
1 SOLUTION/ DROPS OPHTHALMIC
Status: COMPLETED | OUTPATIENT
Start: 2018-04-11 | End: 2018-04-11

## 2018-04-11 RX ORDER — BALANCED SALT SOLUTION 6.4; .75; .48; .3; 3.9; 1.7 MG/ML; MG/ML; MG/ML; MG/ML; MG/ML; MG/ML
SOLUTION OPHTHALMIC PRN
Status: DISCONTINUED | OUTPATIENT
Start: 2018-04-11 | End: 2018-04-11 | Stop reason: HOSPADM

## 2018-04-11 RX ORDER — TROPICAMIDE 10 MG/ML
1 SOLUTION/ DROPS OPHTHALMIC
Status: COMPLETED | OUTPATIENT
Start: 2018-04-11 | End: 2018-04-11

## 2018-04-11 RX ORDER — PHENYLEPHRINE HYDROCHLORIDE 25 MG/ML
1 SOLUTION/ DROPS OPHTHALMIC
Status: COMPLETED | OUTPATIENT
Start: 2018-04-11 | End: 2018-04-11

## 2018-04-11 RX ADMIN — CYCLOPENTOLATE HYDROCHLORIDE 1 DROP: 10 SOLUTION/ DROPS OPHTHALMIC at 11:22

## 2018-04-11 RX ADMIN — CYCLOPENTOLATE HYDROCHLORIDE 1 DROP: 10 SOLUTION/ DROPS OPHTHALMIC at 11:26

## 2018-04-11 RX ADMIN — CYCLOPENTOLATE HYDROCHLORIDE 1 DROP: 10 SOLUTION/ DROPS OPHTHALMIC at 11:17

## 2018-04-11 RX ADMIN — PHENYLEPHRINE HYDROCHLORIDE 1 DROP: 2.5 SOLUTION/ DROPS OPHTHALMIC at 11:26

## 2018-04-11 RX ADMIN — LIDOCAINE HYDROCHLORIDE 1 ML: 10 INJECTION, SOLUTION EPIDURAL; INFILTRATION; INTRACAUDAL; PERINEURAL at 11:29

## 2018-04-11 RX ADMIN — TROPICAMIDE 1 DROP: 10 SOLUTION/ DROPS OPHTHALMIC at 11:26

## 2018-04-11 RX ADMIN — DEXMEDETOMIDINE HYDROCHLORIDE 12 MCG: 100 INJECTION, SOLUTION INTRAVENOUS at 12:24

## 2018-04-11 RX ADMIN — ONDANSETRON 4 MG: 2 INJECTION INTRAMUSCULAR; INTRAVENOUS at 12:24

## 2018-04-11 RX ADMIN — TROPICAMIDE 1 DROP: 10 SOLUTION/ DROPS OPHTHALMIC at 11:22

## 2018-04-11 RX ADMIN — SODIUM CHLORIDE, POTASSIUM CHLORIDE, SODIUM LACTATE AND CALCIUM CHLORIDE 500 ML: 600; 310; 30; 20 INJECTION, SOLUTION INTRAVENOUS at 11:27

## 2018-04-11 RX ADMIN — PHENYLEPHRINE HYDROCHLORIDE 1 DROP: 2.5 SOLUTION/ DROPS OPHTHALMIC at 11:22

## 2018-04-11 RX ADMIN — MOXIFLOXACIN 1 DROP: 5 SOLUTION/ DROPS OPHTHALMIC at 11:17

## 2018-04-11 RX ADMIN — MOXIFLOXACIN 1 DROP: 5 SOLUTION/ DROPS OPHTHALMIC at 11:26

## 2018-04-11 RX ADMIN — PROPARACAINE HYDROCHLORIDE 1 DROP: 5 SOLUTION/ DROPS OPHTHALMIC at 11:16

## 2018-04-11 RX ADMIN — PHENYLEPHRINE HYDROCHLORIDE 1 DROP: 2.5 SOLUTION/ DROPS OPHTHALMIC at 11:17

## 2018-04-11 RX ADMIN — MOXIFLOXACIN 1 DROP: 5 SOLUTION/ DROPS OPHTHALMIC at 11:22

## 2018-04-11 RX ADMIN — MIDAZOLAM 2 MG: 1 INJECTION INTRAMUSCULAR; INTRAVENOUS at 12:24

## 2018-04-11 RX ADMIN — TROPICAMIDE 1 DROP: 10 SOLUTION/ DROPS OPHTHALMIC at 11:17

## 2018-04-11 ASSESSMENT — LIFESTYLE VARIABLES: TOBACCO_USE: 0

## 2018-04-11 NOTE — OP NOTE
PREOPERATIVE DIAGNOSIS: Visually significant cataract, Left eye   POSTOPERATIVE DIAGNOSIS: Same   PROCEDURES:   1. Cataract extraction with intraocular lens implant Left eye.  SURGEON: Damian Parnell M.D.  INDICATIONS: The patient Debra Fuentes presented to the eye clinic with decreased vision secondary to cataract in the Left eye. The risks, including, but not limited to infection, loss of vision, loss of eye, need for more surgery, and bleeding, along with the benefits, alternatives, expectations, and the procedure itself were discussed at length with the patient who wished to proceed with surgery. All questions were answered to the patient's satisfaction.  DESCRIPTION OF PROCEDURE:   Prior to the procedure, appropriate cardiac and respiratory monitors were applied to the patient.  In the pre-operative holding area, a drop of topical tetracaine followed by povidone iodine followed by lidocaine gel.  The patient was brought to the operating room where a surgical pause was carried out to identify with all members of the surgical team the correct surgical site.  With adequate anesthesia, the Left eye was prepped and draped in the usual sterile fashion. A lid speculum was placed, and the operating microscope was rotated into position. A paracentesis was created.  Through this limbal paracentesis, the anterior chamber was filled with preservative-free lidocaine followed by viscoelastic.   A temporal clear corneal incision was created at the limbus using a 2.6 mm blade. A capsulorrhexis was initiated using a cystotome and was completed in continuous and circular fashion using the capsulorrhexis forceps. The lens nucleus was hydrodissected using balanced salt solution.  The lens nucleus was rotated and removed using phacoemulsification in a stop and chop technique.  Residual cortical material was removed using irrigation-aspiration.  The capsular bag was reinflated to its maximal extent with cohesive viscoelastic.   A 20.5 diopter ZCBOO was inserted into the capsular bag and noted to be well centered.  The lens power selected was reviewed using the intraocular lens power measurements that were obtained preoperatively to confirm that the correct lens was selected for the desired post-operative refractive state. The residual viscoelastic was aspirated. The anterior chamber was inflated with balanced salt solution and the wounds were hydrated and found to be self-sealing.  The eye was palpated and found to be of normal physiologic pressure. The lid speculum was removed. One drop of postoperative anti-inflammatory and antibiotic medication was instilled in the eye and a clear shield placed over the eye. The patient tolerated the procedure well and there were no intraoperative complications.      PLAN: The patient will be discharged to home and will follow up tomorrow in the eye clinic.  EBL:  None  Complications:  None    Implant Name Type Inv. Item Serial No.  Lot No. LRB No. Used   EYE IMP IOL NOEL PCL TECNIS ZCB00 20.5 Lens/Eye Implant EYE IMP IOL NOEL PCL TECNIS ZCB00 20.5 [id number] [employer]   Left 1

## 2018-04-11 NOTE — ANESTHESIA CARE TRANSFER NOTE
Patient: Debra Fuentes    Procedure(s):  LEFT EYE PHACOEMULSIFICATION CLEAR CORNEA WITH STANDARD INTRAOCULAR LENS IMPLANT  - Wound Class: I-Clean    Diagnosis: NUCLEAR CATARACT   Diagnosis Additional Information: No value filed.    Anesthesia Type:   MAC     Note:  Airway :Room Air  Patient transferred to:PACU  Comments: [facility]red to [facility] recovery room in recliner with armrests up, spontaneous respirations, O2 saturation maintained greater than 98% with oxygen via room air. All monitors and alarms on and functioning, clinically stable vital signs. Report given to recovery RN and questions answered. Patient alert and following verbal directions.Handoff Report: Identifed the Patient, Identified the Reponsible Provider, Reviewed the pertinent medical history, Discussed the surgical course, Reviewed Intra-OP anesthesia mangement and issues during anesthesia, Set expectations for post-procedure period and Allowed opportunity for questions and acknowledgement of understanding      Vitals: (Last set prior to Anesthesia Care Transfer)    CRNA VITALS  4/11/2018 1215 - 4/11/2018 1247      4/11/2018             EKG: Sinus rhythm                Electronically Signed By: CHERI Tinoco CRNA  April 11, 2018  12:47 PM

## 2018-04-11 NOTE — IP AVS SNAPSHOT
MRN:[id number]                      After Visit Summary   4/11/2018    Debra Fuentes    MRN: [id number]           Thank you!     Thank you for choosing [facility] for your care. Our goal is always to provide you with excellent care. Hearing back from our patients is one way we can continue to improve our services. Please take a few minutes to complete the written survey that you may receive in the mail after you visit with us. Thank you!        Patient Information     Date Of Birth          1950        About your hospital stay     You were admitted on:  April 11, 2018 You last received care in the:  [facility]    You were discharged on:  April 11, 2018       Who to Call     For medical emergencies, please call 911.  For non-urgent questions about your medical care, please call your primary care provider or clinic, [phone]  For questions related to your surgery, please call your surgery clinic        Attending Provider     Provider Specialty    Damian Parnell MD Ophthalmology       Primary Care Provider Office Phone # Fax #    [facility] [phone]      Further instructions from your care team       [facility] Discharge  Instructions  Anesthesia (Eye Care Center)   Adult Discharge Instructions    For 24 hours after surgery    1. Get plenty of rest.  Make arrangements to have a responsible adult stay with you for at least 6 hours after you leave the hospital.  2. Do not drive or use heavy equipment for 24 hours.    3. Do not drink alcohol for 24 hours.  4. Do not sign legal documents or make important decisions for 24 hours.  5. Avoid strenuous or risky activities. You may feel lightheaded.  If so, sit for a few minutes before standing.  Have someone help you get up.   6. Conscious sedation patients may resume a regular diet..  7. Any questions of medical nature, call your physician.    [facility]  "Hospital  Cataract Surgery Discharge Instructions  [facility] MD CALIXTO Cuenca MD J. Hasan, MD C. Nichols, MD J. O'Neill, MD S. Schaefer, MD J. Stephens, MD        Start using drops when you arrive at home today    Ofloxacin (Tan top) - one drop in surgical eye 3 times  per day until gone.     Prednisolone acetate 1.0% (pink or white top) - one drop in surgical eye 3 times per day until gone.    Ketorolac (Grey top) - one drop in surgical eye 3 times per day until gone        Place shield over surgical eye at bedtime for 3 nights.      The eye will feel itchy, scratchy, and vision will be blurred, you may take Tylenol for the scratchy feeling if this is bothersome.      No eye rubbing or swimming for I week.      You may resume all prescription medications as directed by your primary doctor.      Call if increasing pain, progressively worsening vision or worsening redness of surgical eye.      On-call doctor can be reached at [phone].    Pending Results     No orders found from 4/9/2018 to 4/12/2018.            Admission Information     Date & Time Provider Department Dept. Phone    4/11/2018 Damian Parnell MD [facility] [phone]      Your Vitals Were     Blood Pressure Pulse Temperature Respirations Height Weight    104/71 70 97.9  F (36.6  C) (Temporal) 16 1.534 m (5' 0.39\") 65.5 kg (144 lb 6.4 oz)    Last Period Pulse Oximetry BMI (Body Mass Index)             05/15/2005 97% 27.83 kg/m2         MyChart Information     [facility] gives you secure access to your electronic health record. If you see a primary care provider, you can also send messages to your care team and make appointments. If you have questions, please call your primary care clinic.  If you do not have a primary care provider, please call [phone] and they will assist you.        Care EveryWhere ID     This is your Care EveryWhere ID. " This could be used by other organizations to access your [facility] medical records  [id number]        Equal Access to Services     GITA CARDONA : Hadii sergei Fernandez, holley davis, shahbazjoni beverlynanyviky light, satinder perezlivemynor gastelum. So [facility] [phone].    ATENCIÓN: Si habla español, tiene a jett disposición servicios gratuitos de asistencia lingüística. Llame al [phone].    We comply with applicable federal civil rights laws and [place] laws. We do not discriminate on the basis of race, color, national origin, age, disability, sex, sexual orientation, or gender identity.               Review of your medicines      CONTINUE these medicines which have NOT CHANGED        Dose / Directions    calcium carbonate-vitamin D 500-400 MG-UNIT Tabs per tablet   Used for:  Osteoporosis        Dose:  1 tablet   Take 1 tablet by mouth 2 times daily   Quantity:  730 tablet   Refills:  0       fluticasone 50 MCG/ACT spray   Commonly known as:  FLONASE   Used for:  Chronic rhinitis        Dose:  2 spray   Spray 2 sprays into both nostrils daily   Quantity:  192 g   Refills:  0       ketorolac 0.5 % ophthalmic solution   Commonly known as:  ACULAR        Refills:  0       levothyroxine 75 MCG tablet   Commonly known as:  SYNTHROID/LEVOTHROID   Used for:  Nonspecific abnormal results of thyroid function study        TAKE ONE TABLET BY MOUTH DAILY   Quantity:  30 tablet   Refills:  0       LORazepam 1 MG tablet   Commonly known as:  ATIVAN   Used for:  Generalized anxiety disorder        1 TABLET PO QHS prn   Quantity:  30 tablet   Refills:  0       multivitamin, therapeutic with minerals Tabs tablet   Used for:  Osteoporosis        1 daily   Quantity:  365 each   Refills:  0       ofloxacin 0.3 % ophthalmic solution   Commonly known as:  OCUFLOX        Refills:  0       OMEGA-3 FISH OIL PO        Dose:  2 g   Take 2 g by mouth daily   Refills:  0       prednisoLONE acetate 1 % ophthalmic susp    Commonly known as:  PRED FORTE        Refills:  0       simvastatin 40 MG tablet   Commonly known as:  ZOCOR   Used for:  Hyperlipidemia, unspecified        TAKE ONE TABLET BY MOUTH AT BEDTIME   Quantity:  30 tablet   Refills:  0                Protect others around you: Learn how to safely use, store and throw away your medicines at www.disposemymeds.org.             Medication List: This is a list of all your medications and when to take them. Check marks below indicate your daily home schedule. Keep this list as a reference.      Medications           Morning Afternoon Evening Bedtime As Needed    calcium carbonate-vitamin D 500-400 MG-UNIT Tabs per tablet   Take 1 tablet by mouth 2 times daily                                fluticasone 50 MCG/ACT spray   Commonly known as:  FLONASE   Spray 2 sprays into both nostrils daily                                ketorolac 0.5 % ophthalmic solution   Commonly known as:  ACULAR                                levothyroxine 75 MCG tablet   Commonly known as:  SYNTHROID/LEVOTHROID   TAKE ONE TABLET BY MOUTH DAILY                                LORazepam 1 MG tablet   Commonly known as:  ATIVAN   1 TABLET PO QHS prn                                multivitamin, therapeutic with minerals Tabs tablet   1 daily                                ofloxacin 0.3 % ophthalmic solution   Commonly known as:  OCUFLOX   Last time this was given:  1 drop on 4/11/2018 12:38 PM                                OMEGA-3 FISH OIL PO   Take 2 g by mouth daily                                prednisoLONE acetate 1 % ophthalmic susp   Commonly known as:  PRED FORTE   Last time this was given:  1 drop on 4/11/2018 12:37 PM                                simvastatin 40 MG tablet   Commonly known as:  ZOCOR   TAKE ONE TABLET BY MOUTH AT BEDTIME

## 2018-04-11 NOTE — DISCHARGE INSTRUCTIONS
[facility] Anesthesia [facility] Discharge  Instructions  Anesthesia (Eye Care Center)   Adult Discharge Instructions    For 24 hours after surgery    1. Get plenty of rest.  Make arrangements to have a responsible adult stay with you for at least 6 hours after you leave the hospital.  2. Do not drive or use heavy equipment for 24 hours.    3. Do not drink alcohol for 24 hours.  4. Do not sign legal documents or make important decisions for 24 hours.  5. Avoid strenuous or risky activities. You may feel lightheaded.  If so, sit for a few minutes before standing.  Have someone help you get up.   6. Conscious sedation patients may resume a regular diet..  7. Any questions of medical nature, call your physician.    [facility]  Cataract Surgery Discharge Instructions  [facility] MD CALIXTO Cuenca MD J. Hasan, MD C. Nichols, MD J. O'Neill, MD S. Schaefer, MD J. Stephens, MD        Start using drops when you arrive at home today    Ofloxacin (Tan top) - one drop in surgical eye 3 times  per day until gone.     Prednisolone acetate 1.0% (pink or white top) - one drop in surgical eye 3 times per day until gone.    Ketorolac (Grey top) - one drop in surgical eye 3 times per day until gone        Place shield over surgical eye at bedtime for 3 nights.      The eye will feel itchy, scratchy, and vision will be blurred, you may take Tylenol for the scratchy feeling if this is bothersome.      No eye rubbing or swimming for I week.      You may resume all prescription medications as directed by your primary doctor.      Call if increasing pain, progressively worsening vision or worsening redness of surgical eye.      On-call doctor can be reached at [phone].

## 2018-04-11 NOTE — IP AVS SNAPSHOT
[facility]    [address]    [place] [address]    Phone:  [phone]    Fax:  [phone]                                       After Visit Summary   4/11/2018    Debra Fuentes    MRN: [id number]           After Visit Summary Signature Page     I have received my discharge instructions, and my questions have been answered. I have discussed any challenges I see with this plan with the nurse or doctor.    ..........................................................................................................................................  Patient/Patient Representative Signature      ..........................................................................................................................................  Patient Representative Print Name and Relationship to Patient    ..................................................               ................................................  Date                                            Time    ..........................................................................................................................................  Reviewed by Signature/Title    ...................................................              ..............................................  Date                                                            Time

## 2018-04-11 NOTE — ANESTHESIA POSTPROCEDURE EVALUATION
Patient: Debra Fuentes    Procedure(s):  LEFT EYE PHACOEMULSIFICATION CLEAR CORNEA WITH STANDARD INTRAOCULAR LENS IMPLANT  - Wound Class: I-Clean    Diagnosis:NUCLEAR CATARACT   Diagnosis Additional Information: No value filed.    Anesthesia Type:  MAC    Note:  Anesthesia Post Evaluation    Patient location during evaluation: PACU  Patient participation: Able to fully participate in evaluation  Level of consciousness: awake and alert  Pain management: adequate  Airway patency: patent  Cardiovascular status: acceptable  Respiratory status: acceptable and unassisted  Hydration status: acceptable  PONV: none             Last vitals:  Vitals:    04/11/18 1120 04/11/18 1250   BP: 125/76 104/71   Pulse: 70    Resp: 16 16   Temp: 36.6  C (97.9  F)    SpO2: 97% 97%         Electronically Signed By: Jd Schofield MD  April 11, 2018  1:56 PM

## 2018-04-11 NOTE — ANESTHESIA PREPROCEDURE EVALUATION
Anesthesia Evaluation     .      No history of anesthetic complications          ROS/MED HX    ENT/Pulmonary:      (-) tobacco use and sleep apnea   Neurologic:  - neg neurologic ROS     Cardiovascular:     (+) Dyslipidemia, ----. : . . . :. .       METS/Exercise Tolerance:  >4 METS   Hematologic:  - neg hematologic  ROS       Musculoskeletal:         GI/Hepatic:  - neg GI/hepatic ROS      (-) GERD   Renal/Genitourinary:  - ROS Renal section negative       Endo:     (+) thyroid problem hypothyroidism, .      Psychiatric:     (+) psychiatric history anxiety      Infectious Disease:  - neg infectious disease ROS       Malignancy:      - no malignancy   Other:    - neg other ROS                 Physical Exam  Normal systems: cardiovascular and pulmonary    Airway   Mallampati: II  TM distance: >3 FB  Neck ROM: full    Dental   (+) caps    Cardiovascular       Pulmonary                     Anesthesia Plan      History & Physical Review  History and physical reviewed and following examination; no interval change.    ASA Status:  2 .    NPO Status:  > 8 hours    Plan for MAC Reason for MAC:  Procedure to face, neck, head or breast  PONV prophylaxis:  Ondansetron (or other 5HT-3)       Postoperative Care  Postoperative pain management:  Oral pain medications and IV analgesics.      Consents  Anesthetic plan, risks, benefits and alternatives discussed with:  Patient..                          .

## 2019-11-13 ENCOUNTER — TRANSFERRED RECORDS (OUTPATIENT)
Dept: HEALTH INFORMATION MANAGEMENT | Facility: CLINIC | Age: 69
End: 2019-11-13

## 2019-12-08 ENCOUNTER — HEALTH MAINTENANCE LETTER (OUTPATIENT)
Age: 69
End: 2019-12-08

## 2020-03-15 ENCOUNTER — HEALTH MAINTENANCE LETTER (OUTPATIENT)
Age: 70
End: 2020-03-15

## 2022-03-04 ENCOUNTER — DOCUMENTATION ONLY (OUTPATIENT)
Dept: OTHER | Facility: CLINIC | Age: 72
End: 2022-03-04
Payer: COMMERCIAL

## 2023-02-18 ENCOUNTER — HEALTH MAINTENANCE LETTER (OUTPATIENT)
Age: 73
End: 2023-02-18

## 2023-06-14 ENCOUNTER — TRANSFERRED RECORDS (OUTPATIENT)
Dept: HEALTH INFORMATION MANAGEMENT | Facility: CLINIC | Age: 73
End: 2023-06-14

## 2023-06-14 LAB
ALT SERPL-CCNC: 27 U/L (ref 8–45)
AST SERPL-CCNC: 28 U/L (ref 5–41)
CHOLESTEROL (EXTERNAL): 153 MG/DL
CREATININE (EXTERNAL): 0.82 MG/DL (ref 0.57–1.11)
GFR ESTIMATED (EXTERNAL): >60 ML/MIN/1.73M2
GLUCOSE (EXTERNAL): 98 MG/DL (ref 70–100)
HDLC SERPL-MCNC: 52 MG/DL
LDL CHOLESTEROL CALCULATED (EXTERNAL): 88 MG/DL (ref 0–159)
POTASSIUM (EXTERNAL): 3.8 MMOL/L (ref 3.5–5.1)
TRIGLYCERIDES (EXTERNAL): 65 MG/DL (ref 30–150)
TSH SERPL-ACNC: 1.2 UIU/ML (ref 0.47–5)

## 2023-07-10 ENCOUNTER — TRANSCRIBE ORDERS (OUTPATIENT)
Dept: OTHER | Age: 73
End: 2023-07-10

## 2023-07-10 DIAGNOSIS — M81.6 LOCALIZED OSTEOPOROSIS WITHOUT CURRENT PATHOLOGICAL FRACTURE: Primary | ICD-10-CM

## 2023-09-11 ENCOUNTER — OFFICE VISIT (OUTPATIENT)
Dept: ENDOCRINOLOGY | Facility: CLINIC | Age: 73
End: 2023-09-11
Payer: COMMERCIAL

## 2023-09-11 VITALS
TEMPERATURE: 97.9 F | BODY MASS INDEX: 28.07 KG/M2 | WEIGHT: 143 LBS | OXYGEN SATURATION: 97 % | RESPIRATION RATE: 16 BRPM | HEIGHT: 60 IN | SYSTOLIC BLOOD PRESSURE: 135 MMHG | DIASTOLIC BLOOD PRESSURE: 75 MMHG | HEART RATE: 67 BPM

## 2023-09-11 DIAGNOSIS — M81.6 LOCALIZED OSTEOPOROSIS WITHOUT CURRENT PATHOLOGICAL FRACTURE: ICD-10-CM

## 2023-09-11 PROCEDURE — 99203 OFFICE O/P NEW LOW 30 MIN: CPT | Performed by: PHYSICIAN ASSISTANT

## 2023-09-11 RX ORDER — VITAMIN E (DL,TOCOPHERYL ACET) 180 MG
CAPSULE ORAL
COMMUNITY

## 2023-09-11 NOTE — LETTER
9/11/2023         RE: Debra Fuentes  [address]  [place] [address]        Dear Colleague,    Thank you for referring your patient, Debra Fuentes, to the [facility]. Please see a copy of my visit note below.    Assessment/Plan :   Osteoporosis. We discussed Debra Pascual's recent DXA scan and medication options. At present time, I think taking a year medication holiday from alendronate, may be the best next step. We can then reevaluate her bone density and look for any signs of increasing bone loss. At present time, I don't think she would qualify for Prolia. She has not had bone loss while on the alendronate and she has no history of osteoporotic fractures. I encourage her to continue with the calcium and vitamin D supplementation She will follow-up with her primary care provider and if she has any further questions, she can reach out to me through [facility].       I have independently reviewed and interpreted labs, imaging as indicated.      Chief complaint:  Debra Pascual is a 73 year old female who comes to our office for help in the management of osteoporosis.     I have reviewed Care Everywhere including [facility], [facility], [facility], [place], [facility], [facility] , [facility], [place] lab reports, imaging reports and provider notes as indicated.      HISTORY OF PRESENT ILLNESS  Debra Pascual was diagnosed with osteoporosis over 10 yrs ago. It was found on a routine DXA scan. She was started taking calcium and vitamin D along with once weekly alendronate. She took the alendronate for about 5 yrs. She remembers that her primary care provider at the time, didn't refill the medication, so Debra Pascual stopped taking it for about a year. She then had a repeat DXA scan in 2017. It found evidence of osteoporosis in the femoral neck (T-score of -3.1) and osteopenia in the total hip and lumbar spine (T-score -2.2 and -1.8). She was then restarted on alendronate.    She  has now been on alendronate for another 5 yrs. Her primary care provider was concerned about the possibility of adverse effects with long term use. Debra Pascual's recent DXA scan did show some improvement. Total hip T-score is now -2.0, Femoral Neck is -2.7, and Lumbar Spine is -1.6. Debra Pascual has no recent fractures and she does not show any significant loss in height. Her primary care provider recommended that she stop the alendronate and possibly consider switching to Prolia to improve bone density.    Endocrine relevant labs are as follows:    Relevant imaging is as follows: (as read by me as it pertains to endocrine relevant organs)    Please see attached chart notes    REVIEW OF SYSTEMS    Endocrine: positive for osteoporosis  Skin: negative  Eyes: negative  Ears/Nose/Throat: negative  Respiratory: No shortness of breath, dyspnea on exertion, cough, or hemoptysis  Cardiovascular: negative  Gastrointestinal: negative for, dysphagia, dyspepsia, and reflux  Genitourinary: negative  Musculoskeletal: positive for arthritis, negative for, muscular weakness, and nocturnal cramping  Neurologic: negative  Psychiatric: negative  Hematologic/Lymphatic/Immunologic: negative    Past Medical History  Past Medical History:   Diagnosis Date     Anxiety state, unspecified     Anxiety, lorazapam     Chronic rhinitis     allergies     Depressive disorder, not elsewhere classified     2 episodes, post-partum and situational     Dyslipidemia      Hypothyroidism      Osteoporosis        Medications  Current Outpatient Medications   Medication Sig Dispense Refill     calcium carbonate-vitamin D 500-400 MG-UNIT TABS tablt Take 1 tablet by mouth 2 times daily 730 tablet 0     dextran 70-hypromellose (TEARS NATURALE FREE PF) 0.1-0.3 % ophthalmic solution Place 1 drop into both eyes daily as needed       fluticasone (FLONASE) 50 MCG/ACT spray Spray 2 sprays into both nostrils daily 192 g 0     gentamicin (GARAMYCIN) 0.3 % ophthalmic ointment  "0.5 inches as needed       levothyroxine (SYNTHROID/LEVOTHROID) 75 MCG tablet TAKE ONE TABLET BY MOUTH DAILY 30 tablet 0     LORazepam (ATIVAN) 1 MG tablet 1 TABLET PO QHS prn 30 tablet 0     Multiple Vitamins-Minerals (HAIR SKIN NAILS) CAPS        multivitamin, therapeutic with minerals (MULTI-VITAMIN) TABS 1 daily 365 each 0     Omega-3 Fatty Acids (OMEGA-3 FISH OIL PO) Take 2 g by mouth daily       simvastatin (ZOCOR) 40 MG tablet TAKE ONE TABLET BY MOUTH AT BEDTIME 30 tablet 0       Allergies  Allergies   Allergen Reactions     No Known Drug Allergy      Seasonal Allergies          Family History  family history includes Allergies in her sister; Arthritis in her mother and sister; C.A.D. in her father; Cancer in her father, mother, and paternal grandmother; Depression in her brother, daughter, daughter, mother, and sister; Eye Disorder in her maternal grandmother; Heart Disease in her father; Hypertension in her mother; Lipids in her mother; Neurologic Disorder in her daughter; Obesity in her mother; Prostate Cancer in her father; Respiratory in her maternal grandfather and sister; Thyroid Disease in her brother, mother, sister, and sister.    Social History  Social History     Tobacco Use     Smoking status: Never     Smokeless tobacco: Never     Tobacco comments:     no smokers in household   Substance Use Topics     Alcohol use: Yes     Comment: 5 drinks yearly     Drug use: No       Physical Exam  /75 (BP Location: Left arm, Patient Position: Chair, Cuff Size: Adult Regular)   Pulse 67   Temp 97.9  F (36.6  C) (Tympanic)   Resp 16   Ht 1.534 m (5' 0.39\")   Wt 64.9 kg (143 lb)   LMP 05/15/2005   SpO2 97%   Breastfeeding No   BMI 27.56 kg/m    Body mass index is 27.56 kg/m .  GENERAL :  In no apparent distress. She is accompanied by her   SKIN: Normal color, normal temperature, texture.  No hirsutism, alopecia or purple striae.     EYES: PERRL, EOMI, No scleral icterus,  No proptosis, " conjunctival redness, stare, retraction  NEURO: awake, alert, responds appropriately to questions.  Cranial nerves intact.   Moves all extremities; Gait normal.  No tremor of the outstretched hand.   EXTREMITIES: No clubbing, cyanosis or edema.            Again, thank you for allowing me to participate in the care of your patient.        Sincerely,        Chyna Meyer PA-C

## 2023-09-11 NOTE — PROGRESS NOTES
Assessment/Plan :   Osteoporosis. We discussed Debra Pascual's recent DXA scan and medication options. At present time, I think taking a year medication holiday from alendronate, may be the best next step. We can then reevaluate her bone density and look for any signs of increasing bone loss. At present time, I don't think she would qualify for Prolia. She has not had bone loss while on the alendronate and she has no history of osteoporotic fractures. I encourage her to continue with the calcium and vitamin D supplementation She will follow-up with her primary care provider and if she has any further questions, she can reach out to me through [employer].       I have independently reviewed and interpreted labs, imaging as indicated.      Chief complaint:  Debra Pascual is a 73 year old female who comes to our office for help in the management of osteoporosis.     I have reviewed Care Everywhere including [facility], [facility], [facility] [place] [facility], [place], [facility], [facility] , [facility], [place] lab reports, imaging reports and provider notes as indicated.      HISTORY OF PRESENT ILLNESS  Debra Pascual was diagnosed with osteoporosis over 10 yrs ago. It was found on a routine DXA scan. She was started taking calcium and vitamin D along with once weekly alendronate. She took the alendronate for about 5 yrs. She remembers that her primary care provider at the time, didn't refill the medication, so Debra Pascual stopped taking it for about a year. She then had a repeat DXA scan in 2017. It found evidence of osteoporosis in the femoral neck (T-score of -3.1) and osteopenia in the total hip and lumbar spine (T-score -2.2 and -1.8). She was then restarted on alendronate.    She has now been on alendronate for another 5 yrs. Her primary care provider was concerned about the possibility of adverse effects with long term use. Debra Pascual's recent DXA scan did show some improvement. Total hip T-score is now -2.0, Femoral Neck is -2.7, and  Lumbar Spine is -1.6. Debra Pascual has no recent fractures and she does not show any significant loss in height. Her primary care provider recommended that she stop the alendronate and possibly consider switching to Prolia to improve bone density.    Endocrine relevant labs are as follows:    Relevant imaging is as follows: (as read by me as it pertains to endocrine relevant organs)    Please see attached chart notes    REVIEW OF SYSTEMS    Endocrine: positive for osteoporosis  Skin: negative  Eyes: negative  Ears/Nose/Throat: negative  Respiratory: No shortness of breath, dyspnea on exertion, cough, or hemoptysis  Cardiovascular: negative  Gastrointestinal: negative for, dysphagia, dyspepsia, and reflux  Genitourinary: negative  Musculoskeletal: positive for arthritis, negative for, muscular weakness, and nocturnal cramping  Neurologic: negative  Psychiatric: negative  Hematologic/Lymphatic/Immunologic: negative    Past Medical History  Past Medical History:   Diagnosis Date    Anxiety state, unspecified     Anxiety, lorazapam    Chronic rhinitis     allergies    Depressive disorder, not elsewhere classified     2 episodes, post-partum and situational    Dyslipidemia     Hypothyroidism     Osteoporosis        Medications  Current Outpatient Medications   Medication Sig Dispense Refill    calcium carbonate-vitamin D 500-400 MG-UNIT TABS tablt Take 1 tablet by mouth 2 times daily 730 tablet 0    dextran 70-hypromellose (TEARS NATURALE FREE PF) 0.1-0.3 % ophthalmic solution Place 1 drop into both eyes daily as needed      fluticasone (FLONASE) 50 MCG/ACT spray Spray 2 sprays into both nostrils daily 192 g 0    gentamicin (GARAMYCIN) 0.3 % ophthalmic ointment 0.5 inches as needed      levothyroxine (SYNTHROID/LEVOTHROID) 75 MCG tablet TAKE ONE TABLET BY MOUTH DAILY 30 tablet 0    LORazepam (ATIVAN) 1 MG tablet 1 TABLET PO QHS prn 30 tablet 0    Multiple Vitamins-Minerals (HAIR SKIN NAILS) CAPS       multivitamin,  "therapeutic with minerals (MULTI-VITAMIN) TABS 1 daily 365 each 0    Omega-3 Fatty Acids (OMEGA-3 FISH OIL PO) Take 2 g by mouth daily      simvastatin (ZOCOR) 40 MG tablet TAKE ONE TABLET BY MOUTH AT BEDTIME 30 tablet 0       Allergies  Allergies   Allergen Reactions    No Known Drug Allergy     Seasonal Allergies          Family History  family history includes Allergies in her sister; Arthritis in her mother and sister; C.A.D. in her father; Cancer in her father, mother, and paternal grandmother; Depression in her brother, daughter, daughter, mother, and sister; Eye Disorder in her maternal grandmother; Heart Disease in her father; Hypertension in her mother; Lipids in her mother; Neurologic Disorder in her daughter; Obesity in her mother; Prostate Cancer in her father; Respiratory in her maternal grandfather and sister; Thyroid Disease in her brother, mother, sister, and sister.    Social History  Social History     Tobacco Use    Smoking status: Never    Smokeless tobacco: Never    Tobacco comments:     no smokers in household   Substance Use Topics    Alcohol use: Yes     Comment: 5 drinks yearly    Drug use: No       Physical Exam  /75 (BP Location: Left arm, Patient Position: Chair, Cuff Size: Adult Regular)   Pulse 67   Temp 97.9  F (36.6  C) (Tympanic)   Resp 16   Ht 1.534 m (5' 0.39\")   Wt 64.9 kg (143 lb)   LMP 05/15/2005   SpO2 97%   Breastfeeding No   BMI 27.56 kg/m    Body mass index is 27.56 kg/m .  GENERAL :  In no apparent distress. She is accompanied by her   SKIN: Normal color, normal temperature, texture.  No hirsutism, alopecia or purple striae.     EYES: PERRL, EOMI, No scleral icterus,  No proptosis, conjunctival redness, stare, retraction  NEURO: awake, alert, responds appropriately to questions.  Cranial nerves intact.   Moves all extremities; Gait normal.  No tremor of the outstretched hand.   EXTREMITIES: No clubbing, cyanosis or edema.          "

## 2024-08-03 ENCOUNTER — HEALTH MAINTENANCE LETTER (OUTPATIENT)
Age: 74
End: 2024-08-03

## 2025-08-16 ENCOUNTER — HEALTH MAINTENANCE LETTER (OUTPATIENT)
Age: 75
End: 2025-08-16

## (undated) DEVICE — EYE PACK BVI READYPAK KIT #3

## (undated) DEVICE — EYE SHIELD PLASTIC

## (undated) DEVICE — GLOVE PROTEXIS MICRO 7.0  2D73PM70

## (undated) DEVICE — GLOVE PROTEXIS W/NEU-THERA 8.0  2D73TE80

## (undated) DEVICE — GLOVE PROTEXIS MICRO 6.0  2D73PM60

## (undated) DEVICE — TAPE MICROPORE 2"X1.5YD 1530S-2

## (undated) DEVICE — PACK CATARACT CUSTOM SO DALE SEY32CTFCX

## (undated) DEVICE — EYE SOL BSS 15ML BOTTLE 65079515

## (undated) DEVICE — LINEN TOWEL PACK X5 5464

## (undated) DEVICE — EYE KNIFE SLIT XSTAR VISITEC 2.6MM 45DEG 373726

## (undated) DEVICE — GOWN LG DISP 9515

## (undated) DEVICE — EYE SOL BSS 500ML

## (undated) DEVICE — GLOVE PROTEXIS MICRO 6.5  2D73PM65

## (undated) DEVICE — EYE PACK CUSTOM ANTERIOR 30DEG TIP CENTURION PPK6682-04

## (undated) RX ORDER — ONDANSETRON 2 MG/ML
INJECTION INTRAMUSCULAR; INTRAVENOUS
Status: DISPENSED
Start: 2018-03-28

## (undated) RX ORDER — LIDOCAINE HYDROCHLORIDE 10 MG/ML
INJECTION, SOLUTION EPIDURAL; INFILTRATION; INTRACAUDAL; PERINEURAL
Status: DISPENSED
Start: 2018-03-28

## (undated) RX ORDER — LIDOCAINE HYDROCHLORIDE 10 MG/ML
INJECTION, SOLUTION EPIDURAL; INFILTRATION; INTRACAUDAL; PERINEURAL
Status: DISPENSED
Start: 2018-04-11